# Patient Record
Sex: FEMALE | Race: WHITE | URBAN - METROPOLITAN AREA
[De-identification: names, ages, dates, MRNs, and addresses within clinical notes are randomized per-mention and may not be internally consistent; named-entity substitution may affect disease eponyms.]

---

## 2022-10-26 ENCOUNTER — OFFICE VISIT (OUTPATIENT)
Dept: URBAN - METROPOLITAN AREA CLINIC 16 | Facility: CLINIC | Age: 72
End: 2022-10-26
Payer: MEDICARE

## 2022-10-26 DIAGNOSIS — H25.13 AGE-RELATED NUCLEAR CATARACT, BILATERAL: Primary | ICD-10-CM

## 2022-10-26 DIAGNOSIS — H43.813 VITREOUS DEGENERATION, BILATERAL: ICD-10-CM

## 2022-10-26 PROCEDURE — 92004 COMPRE OPH EXAM NEW PT 1/>: CPT | Performed by: OPTOMETRIST

## 2022-10-26 ASSESSMENT — KERATOMETRY: OS: 42.38

## 2022-10-26 ASSESSMENT — INTRAOCULAR PRESSURE
OD: 17
OS: 16

## 2022-10-26 NOTE — IMPRESSION/PLAN
Impression: Age-related nuclear cataract, bilateral: H25.13. Plan: Discussed cataract diagnosis with the patient. Discussed and reviewed treatment options for cataracts. Risks and benefits of surgical treatment were discussed and understood. Pt interested in surgical treatment. Refer for consult w/Dr. Radha Harvey, next available.

## 2022-11-04 ENCOUNTER — OFFICE VISIT (OUTPATIENT)
Dept: URBAN - METROPOLITAN AREA CLINIC 16 | Facility: CLINIC | Age: 72
End: 2022-11-04
Payer: MEDICARE

## 2022-11-04 DIAGNOSIS — H25.813 COMBINED FORMS OF AGE-RELATED CATARACT, BILATERAL: Primary | ICD-10-CM

## 2022-11-04 PROCEDURE — 99204 OFFICE O/P NEW MOD 45 MIN: CPT | Performed by: OPHTHALMOLOGY

## 2022-11-04 ASSESSMENT — INTRAOCULAR PRESSURE
OS: 14
OD: 14

## 2022-11-04 ASSESSMENT — VISUAL ACUITY
OD: 20/30
OS: 20/40

## 2022-11-04 NOTE — IMPRESSION/PLAN
Impression: Combined forms of age-related cataract, bilateral: H25.813. .  Visually significant, quality of life issue, could improve with surgery. Plan: Cataracts account for the patient's complaints. Discussed all risks, benefits, alternatives, procedures and recovery. Patient understands changing glasses will not improve vision. Patient desires to have surgery, recommend phacoemulsification with intraocular lens implant OS. lvl 2 - pt declines premium IOL -  AIM NEAR OU. Re-evaluate OD for possible cataract surgery after OS is done. Not OK for Dexcyu.

## 2022-12-07 ENCOUNTER — PRE-OPERATIVE VISIT (OUTPATIENT)
Dept: URBAN - METROPOLITAN AREA CLINIC 16 | Facility: CLINIC | Age: 72
End: 2022-12-07
Payer: MEDICARE

## 2022-12-07 DIAGNOSIS — H25.813 COMBINED FORMS OF AGE-RELATED CATARACT, BILATERAL: Primary | ICD-10-CM

## 2022-12-07 ASSESSMENT — PACHYMETRY
OD: 25.89
OS: 2.95
OS: 26.32
OD: 2.82

## 2022-12-19 ENCOUNTER — SURGERY (OUTPATIENT)
Dept: URBAN - METROPOLITAN AREA SURGERY 11 | Facility: SURGERY | Age: 72
End: 2022-12-19
Payer: MEDICARE

## 2022-12-19 PROCEDURE — 66984 XCAPSL CTRC RMVL W/O ECP: CPT | Performed by: OPHTHALMOLOGY

## 2022-12-20 ENCOUNTER — POST-OPERATIVE VISIT (OUTPATIENT)
Dept: URBAN - METROPOLITAN AREA CLINIC 33 | Facility: CLINIC | Age: 72
End: 2022-12-20
Payer: MEDICARE

## 2022-12-20 DIAGNOSIS — Z48.810 ENCOUNTER FOR SURGICAL AFTERCARE FOLLOWING SURGERY ON A SENSE ORGAN: Primary | ICD-10-CM

## 2022-12-20 PROCEDURE — 99024 POSTOP FOLLOW-UP VISIT: CPT

## 2022-12-20 ASSESSMENT — INTRAOCULAR PRESSURE
OD: 17
OS: 17

## 2022-12-20 NOTE — IMPRESSION/PLAN
Impression: S/P Cataract Extraction by phacoemulsification with IOL placement; dexycu OS - 1 Day. Encounter for surgical aftercare following surgery on a sense organ  Z48.810. Plan: Eye healing appropriately. All questions answered. Continue all meds. --Advised patient to use artificial tears for comfort.

## 2022-12-30 ENCOUNTER — POST-OPERATIVE VISIT (OUTPATIENT)
Dept: URBAN - METROPOLITAN AREA CLINIC 16 | Facility: CLINIC | Age: 72
End: 2022-12-30
Payer: MEDICARE

## 2022-12-30 DIAGNOSIS — Z48.810 ENCOUNTER FOR SURGICAL AFTERCARE FOLLOWING SURGERY ON A SENSE ORGAN: Primary | ICD-10-CM

## 2022-12-30 PROCEDURE — 99024 POSTOP FOLLOW-UP VISIT: CPT | Performed by: OPTOMETRIST

## 2022-12-30 ASSESSMENT — INTRAOCULAR PRESSURE
OS: 16
OD: 16

## 2022-12-30 NOTE — IMPRESSION/PLAN
Impression: S/P Cataract Extraction by phacoemulsification with IOL placement; dexycu OS - 11 Days. Encounter for surgical aftercare following surgery on a sense organ  Z48.810. Plan: RTC as scheduled. OK to proceed w/Cataract sx OD. --Taper all meds as directed--Advised patient to use artificial tears for comfort.

## 2023-01-09 ENCOUNTER — SURGERY (OUTPATIENT)
Dept: URBAN - METROPOLITAN AREA SURGERY 11 | Facility: SURGERY | Age: 73
End: 2023-01-09
Payer: MEDICARE

## 2023-01-09 PROCEDURE — 66984 XCAPSL CTRC RMVL W/O ECP: CPT | Performed by: OPHTHALMOLOGY

## 2023-01-10 ENCOUNTER — POST-OPERATIVE VISIT (OUTPATIENT)
Dept: URBAN - METROPOLITAN AREA CLINIC 16 | Facility: CLINIC | Age: 73
End: 2023-01-10
Payer: MEDICARE

## 2023-01-10 DIAGNOSIS — Z96.1 PRESENCE OF INTRAOCULAR LENS: Primary | ICD-10-CM

## 2023-01-10 PROCEDURE — 99024 POSTOP FOLLOW-UP VISIT: CPT | Performed by: OPTOMETRIST

## 2023-01-10 ASSESSMENT — INTRAOCULAR PRESSURE
OS: 16
OD: 16

## 2023-01-10 NOTE — IMPRESSION/PLAN
Impression: S/P Cataract Extraction by phacoemulsification with IOL placement OD - 1 Day. Presence of intraocular lens  Z96.1.  Post operative instructions reviewed - Plan: --Continue Ofloxacin 0.3%--Taper Pred-Ketor as directed

## 2023-01-17 ENCOUNTER — POST-OPERATIVE VISIT (OUTPATIENT)
Dept: URBAN - METROPOLITAN AREA CLINIC 16 | Facility: CLINIC | Age: 73
End: 2023-01-17
Payer: MEDICARE

## 2023-01-17 DIAGNOSIS — Z96.1 PRESENCE OF INTRAOCULAR LENS: Primary | ICD-10-CM

## 2023-01-17 PROCEDURE — 99024 POSTOP FOLLOW-UP VISIT: CPT | Performed by: OPTOMETRIST

## 2023-01-17 ASSESSMENT — INTRAOCULAR PRESSURE
OD: 17
OS: 17

## 2023-01-17 NOTE — IMPRESSION/PLAN
Impression: S/P Cataract Extraction by phacoemulsification with IOL placement OD - 8 Days. Presence of intraocular lens  Z96.1.  Post operative instructions reviewed - Plan: --Discontinue Ofloxacin 0.3%--Taper Pred-Ketor as directed

## 2023-02-13 ENCOUNTER — OFFICE VISIT (OUTPATIENT)
Dept: URBAN - METROPOLITAN AREA CLINIC 16 | Facility: CLINIC | Age: 73
End: 2023-02-13
Payer: COMMERCIAL

## 2023-02-13 DIAGNOSIS — Z96.1 PRESENCE OF PSEUDOPHAKIA: ICD-10-CM

## 2023-02-13 DIAGNOSIS — H52.4 PRESBYOPIA: Primary | ICD-10-CM

## 2023-02-13 ASSESSMENT — INTRAOCULAR PRESSURE
OD: 18
OS: 19

## 2023-02-13 ASSESSMENT — VISUAL ACUITY
OD: 20/20
OS: 20/20

## 2024-04-15 ENCOUNTER — OFFICE VISIT (OUTPATIENT)
Dept: URBAN - METROPOLITAN AREA CLINIC 16 | Facility: CLINIC | Age: 74
End: 2024-04-15
Payer: MEDICARE

## 2024-04-15 DIAGNOSIS — H16.143 PUNCTATE KERATITIS, BILATERAL: ICD-10-CM

## 2024-04-15 DIAGNOSIS — H43.813 VITREOUS DEGENERATION, BILATERAL: ICD-10-CM

## 2024-04-15 DIAGNOSIS — H26.493 OTHER SECONDARY CATARACT, BILATERAL: Primary | ICD-10-CM

## 2024-04-15 DIAGNOSIS — H52.4 PRESBYOPIA: ICD-10-CM

## 2024-04-15 PROCEDURE — 92014 COMPRE OPH EXAM EST PT 1/>: CPT | Performed by: OPTOMETRIST

## 2024-04-15 ASSESSMENT — INTRAOCULAR PRESSURE
OS: 16
OD: 16

## 2024-04-15 ASSESSMENT — VISUAL ACUITY
OS: 20/25
OD: 20/25

## 2025-07-22 ENCOUNTER — OFFICE VISIT (OUTPATIENT)
Dept: URBAN - METROPOLITAN AREA CLINIC 16 | Facility: CLINIC | Age: 75
End: 2025-07-22
Payer: MEDICARE

## 2025-07-22 DIAGNOSIS — H43.813 VITREOUS DEGENERATION, BILATERAL: ICD-10-CM

## 2025-07-22 DIAGNOSIS — Z96.1 PRESENCE OF INTRAOCULAR LENS: Primary | ICD-10-CM

## 2025-07-22 DIAGNOSIS — H04.123 DRY EYE SYNDROME OF BILATERAL LACRIMAL GLANDS: ICD-10-CM

## 2025-07-22 PROCEDURE — 99213 OFFICE O/P EST LOW 20 MIN: CPT

## 2025-07-22 ASSESSMENT — INTRAOCULAR PRESSURE
OD: 16
OS: 16